# Patient Record
Sex: FEMALE | URBAN - NONMETROPOLITAN AREA
[De-identification: names, ages, dates, MRNs, and addresses within clinical notes are randomized per-mention and may not be internally consistent; named-entity substitution may affect disease eponyms.]

---

## 2024-06-05 ENCOUNTER — APPOINTMENT (RX ONLY)
Dept: URBAN - NONMETROPOLITAN AREA CLINIC 4 | Facility: CLINIC | Age: 44
Setting detail: DERMATOLOGY
End: 2024-06-05

## 2024-06-05 DIAGNOSIS — Z41.9 ENCOUNTER FOR PROCEDURE FOR PURPOSES OTHER THAN REMEDYING HEALTH STATE, UNSPECIFIED: ICD-10-CM

## 2024-06-05 PROCEDURE — ? COSMETIC CONSULTATION: FILLERS

## 2024-06-05 PROCEDURE — ? OTHER

## 2024-06-05 PROCEDURE — ? COSMETIC CONSULTATION: BOTOX

## 2024-06-05 PROCEDURE — ? DEFER

## 2024-06-05 ASSESSMENT — LOCATION SIMPLE DESCRIPTION DERM
LOCATION SIMPLE: INFERIOR FOREHEAD
LOCATION SIMPLE: LEFT LIP
LOCATION SIMPLE: RIGHT CHEEK

## 2024-06-05 ASSESSMENT — LOCATION ZONE DERM
LOCATION ZONE: FACE
LOCATION ZONE: LIP

## 2024-06-05 ASSESSMENT — LOCATION DETAILED DESCRIPTION DERM
LOCATION DETAILED: LEFT UPPER CUTANEOUS LIP
LOCATION DETAILED: INFERIOR MID FOREHEAD
LOCATION DETAILED: RIGHT INFERIOR MEDIAL MALAR CHEEK

## 2024-06-05 NOTE — PROCEDURE: OTHER
Detail Level: Zone
Render Risk Assessment In Note?: no
Other (Free Text): 24 units glabella and forehead\\nJuvederm nasal labial folds
Note Text (......Xxx Chief Complaint.): This diagnosis correlates with the